# Patient Record
Sex: MALE | Race: OTHER | HISPANIC OR LATINO | URBAN - METROPOLITAN AREA
[De-identification: names, ages, dates, MRNs, and addresses within clinical notes are randomized per-mention and may not be internally consistent; named-entity substitution may affect disease eponyms.]

---

## 2023-07-28 ENCOUNTER — EMERGENCY (EMERGENCY)
Facility: HOSPITAL | Age: 18
LOS: 0 days | Discharge: HOME | End: 2023-07-28
Attending: EMERGENCY MEDICINE
Payer: MEDICAID

## 2023-07-28 VITALS
SYSTOLIC BLOOD PRESSURE: 138 MMHG | TEMPERATURE: 98 F | HEART RATE: 87 BPM | WEIGHT: 145.73 LBS | RESPIRATION RATE: 18 BRPM | HEIGHT: 62 IN | DIASTOLIC BLOOD PRESSURE: 88 MMHG | OXYGEN SATURATION: 98 %

## 2023-07-28 DIAGNOSIS — H10.9 UNSPECIFIED CONJUNCTIVITIS: ICD-10-CM

## 2023-07-28 DIAGNOSIS — H57.89 OTHER SPECIFIED DISORDERS OF EYE AND ADNEXA: ICD-10-CM

## 2023-07-28 PROCEDURE — 99284 EMERGENCY DEPT VISIT MOD MDM: CPT

## 2023-07-28 PROCEDURE — 99282 EMERGENCY DEPT VISIT SF MDM: CPT

## 2023-07-28 RX ORDER — POLYMYXIN B SULF/TRIMETHOPRIM 10000-1/ML
1 DROPS OPHTHALMIC (EYE)
Qty: 1 | Refills: 0
Start: 2023-07-28 | End: 2023-08-03

## 2023-07-28 NOTE — ED PROVIDER NOTE - PATIENT PORTAL LINK FT
You can access the FollowMyHealth Patient Portal offered by [facility] by registering at the following website: [email or web address]. By joining [facility]’s FollowMyHealth portal, you will also be able to view your health information using other applications (apps) compatible with our system.

## 2023-07-28 NOTE — ED PROVIDER NOTE - PHYSICAL EXAMINATION
VITAL SIGNS: I have reviewed nursing notes and confirm.  CONSTITUTIONAL: well-appearing, appropriate for age, non-toxic, NAD  SKIN: Warm dry, normal skin turgor  HEAD: NCAT  EYES: PERRLA, EOM intact, periperal vision intact, erythematous conjunctiva present in right eye. Mild discharge present on eyelashes.  ENT: Moist mucous membranes, normal pharynx with no erythema or exudates.  TM's normal b/l without bulging, no mastoid tenderness  NECK: Supple; non tender. Full ROM. No cervical LAD  EXT: Full ROM, no bony tenderness, no pedal edema, no calf tenderness  NEURO: normal motor. normal sensory.

## 2023-07-28 NOTE — ED PROVIDER NOTE - NSFOLLOWUPCLINICS_GEN_ALL_ED_FT
[facility]  Ophthalmolgy  [address], [address]  [place] [address]  Phone: [phone]  Fax:   Follow Up Time: Routine

## 2023-07-28 NOTE — ED PROVIDER NOTE - CLINICAL SUMMARY MEDICAL DECISION MAKING FREE TEXT BOX
17-year-old male with no significant past medical history, presenting with right eye redness for about 3 weeks.  Patient has been using homeopathic drops without improvement.  Patient does not have a primary doctor and has not seen anyone about this eye.  Patient denies any trauma.  Patient has had some discharge from the eye.  Patient notes he has some blurry vision when his eyes watering.  No foreign body sensation.  No URI symptoms or fever.  Patient does not wear contact lenses but does wear glasses for work.  Exam - Gen - NAD, Head - NCAT, eyes–normal visual acuity, EOMI without pain, no lid edema, PERRLA, conjunctival injection of the right eye, with some mild dried discharge on the eyelashes and medial canthus, pharynx - clear, MMM, TM - clear b/l, Heart - RRR, no m/g/r, Lungs - CTAB, no w/c/r, Abdomen - soft, NT, ND, Skin - No rash, Extremities - FROM, no edema, erythema, ecchymosis, Neuro - CN 2-12 intact, nl strength and sensation, nl gait.  Diagnosis–conjunctivitis.  Plan–discharge home with prescription for Polytrim.  Advised follow-up with adolescent clinic and Ophthalmology clinic if not improved in 1 week.  Given strict return precautions.

## 2023-07-28 NOTE — ED PROVIDER NOTE - OBJECTIVE STATEMENT
Pt is a 18 yo M w/ no PMH presenting to the ED c/o right eye redness. Pt states redness started 3 weeks ago and has not improved. Pt has been trying over the counter homeopathic eye drops without relief. Pt is endorsing yellow discharge in the morning but no vision changes. No sick contacts or trauma.     Denies NVD, fevers, chills, sob, cp       [facility]  [place] - [id number]

## 2023-07-28 NOTE — ED PROVIDER NOTE - ATTENDING CONTRIBUTION TO CARE
17-year-old male with no significant past medical history, presenting with right eye redness for about 3 weeks.  Patient has been using homeopathic drops without improvement.  Patient does not have a primary doctor and has not seen anyone about this eye.  Patient denies any trauma.  Patient has had some discharge from the eye.  Patient notes he has some blurry vision when his eyes watering.  No foreign body sensation.  No URI symptoms or fever.  Patient does not wear contact lenses but does wear glasses for work.  Exam - Gen - NAD, Head - NCAT, eyes–normal visual acuity, EOMI without pain, no lid edema, PERRLA, conjunctival injection of the right eye, with some mild dried discharge on the eyelashes and medial canthus, pharynx - clear, MMM, TM - clear b/l, Heart - RRR, no m/g/r, Lungs - CTAB, no w/c/r, Abdomen - soft, NT, ND, Skin - No rash, Extremities - FROM, no edema, erythema, ecchymosis, Neuro - CN 2-12 intact, nl strength and sensation, nl gait.  Diagnosis–conjunctivitis.  Plan–discharge home with prescription for Polytrim.  Advised follow-up with adolescent clinic and Optho clinic if not improved in 1 week.  Given strict return precautions. 17-year-old male with no significant past medical history, presenting with right eye redness for about 3 weeks.  Patient has been using homeopathic drops without improvement.  Patient does not have a primary doctor and has not seen anyone about this eye.  Patient denies any trauma.  Patient has had some discharge from the eye.  Patient notes he has some blurry vision when his eyes watering.  No foreign body sensation.  No URI symptoms or fever.  Patient does not wear contact lenses but does wear glasses for work.  Exam - Gen - NAD, Head - NCAT, eyes–normal visual acuity, EOMI without pain, no lid edema, PERRLA, conjunctival injection of the right eye, with some mild dried discharge on the eyelashes and medial canthus, pharynx - clear, MMM, TM - clear b/l, Heart - RRR, no m/g/r, Lungs - CTAB, no w/c/r, Abdomen - soft, NT, ND, Skin - No rash, Extremities - FROM, no edema, erythema, ecchymosis, Neuro - CN 2-12 intact, nl strength and sensation, nl gait.  Diagnosis–conjunctivitis.  Plan–discharge home with prescription for Polytrim.  Advised follow-up with adolescent clinic and Ophthalmology clinic if not improved in 1 week.  Given strict return precautions.

## 2023-08-01 PROBLEM — Z78.9 OTHER SPECIFIED HEALTH STATUS: Chronic | Status: ACTIVE | Noted: 2023-07-28

## 2023-08-08 ENCOUNTER — OUTPATIENT (OUTPATIENT)
Dept: OUTPATIENT SERVICES | Facility: HOSPITAL | Age: 18
LOS: 1 days | End: 2023-08-08
Payer: COMMERCIAL

## 2023-08-08 ENCOUNTER — APPOINTMENT (OUTPATIENT)
Dept: PEDIATRIC ADOLESCENT MEDICINE | Facility: CLINIC | Age: 18
End: 2023-08-08
Payer: COMMERCIAL

## 2023-08-08 VITALS
SYSTOLIC BLOOD PRESSURE: 104 MMHG | HEIGHT: 60 IN | BODY MASS INDEX: 28.66 KG/M2 | WEIGHT: 145.99 LBS | OXYGEN SATURATION: 99 % | TEMPERATURE: 96.9 F | HEART RATE: 70 BPM | DIASTOLIC BLOOD PRESSURE: 74 MMHG

## 2023-08-08 DIAGNOSIS — H10.33 UNSPECIFIED ACUTE CONJUNCTIVITIS, BILATERAL: ICD-10-CM

## 2023-08-08 DIAGNOSIS — Z00.00 ENCOUNTER FOR GENERAL ADULT MEDICAL EXAMINATION WITHOUT ABNORMAL FINDINGS: ICD-10-CM

## 2023-08-08 DIAGNOSIS — Z71.89 OTHER SPECIFIED COUNSELING: ICD-10-CM

## 2023-08-08 PROCEDURE — 99214 OFFICE O/P EST MOD 30 MIN: CPT | Mod: 25

## 2023-08-08 PROCEDURE — 99214 OFFICE O/P EST MOD 30 MIN: CPT

## 2023-08-08 PROCEDURE — T1013: CPT

## 2023-08-08 RX ORDER — POLYMYXIN B SULFATE AND TRIMETHOPRIM 10000; 1 [USP'U]/ML; MG/ML
10000-0.1 SOLUTION OPHTHALMIC
Refills: 0 | Status: ACTIVE | COMMUNITY

## 2023-08-08 NOTE — PHYSICAL EXAM
[Conjuctival Injection] : conjunctival injection [NL] : no acute distress, alert [Increased Tearing] : no increased tearing [Discharge] : no discharge [FreeTextEntry5] : right sided conjunctival injection

## 2023-08-08 NOTE — HISTORY OF PRESENT ILLNESS
[de-identified] : ED follow up for eye [FreeTextEntry6] : Right eye redness started 3 weeks ago. Went to the ED 2 weeks ago on 7/28/23 and was prescribed polymixin-tmp [id number] 1 drop every 3 hours which he has been using consistently. He notes improvement however he's been having discharge in the morning. Endorses some blurry vision in the right side. Denies symptoms in left eye. Denies fever, recent illness, recent sick contacts, swimming, seasonal allergies.

## 2023-08-08 NOTE — DISCUSSION/SUMMARY
[FreeTextEntry1] : 17Y M presenting following ED visit for unilateral right-sided conjuctivitis. Patient has been taking polymyxin eye drops daily and endorses significant improvement in symptoms however, he continues to have discharge from the eye when he wakes up in the morning. Discharge improves throughout the day. Vitals reviewed and stable. Physical examination is remarkable for right sided conjunctival injection, no discharge on presentation.   - Continue eye drops and increase to 2-3 drops prior to sleeping - RTC as needed and for NPA to establish care

## 2023-08-11 DIAGNOSIS — H10.33 UNSPECIFIED ACUTE CONJUNCTIVITIS, BILATERAL: ICD-10-CM

## 2023-08-11 DIAGNOSIS — Z71.89 OTHER SPECIFIED COUNSELING: ICD-10-CM

## 2025-06-20 NOTE — ED PROVIDER NOTE - NSICDXNOPASTMEDICALHX_GEN_ALL_ED
[Feeling Fatigued] : feeling fatigued [Negative] : Heme/Lymph <-- Click to add NO pertinent Past Medical History